# Patient Record
Sex: FEMALE | Race: WHITE
[De-identification: names, ages, dates, MRNs, and addresses within clinical notes are randomized per-mention and may not be internally consistent; named-entity substitution may affect disease eponyms.]

---

## 2018-08-21 ENCOUNTER — HOSPITAL ENCOUNTER (OUTPATIENT)
Dept: HOSPITAL 56 - MW.SDS | Age: 73
Discharge: HOME | End: 2018-08-21
Attending: SURGERY
Payer: MEDICARE

## 2018-08-21 VITALS — SYSTOLIC BLOOD PRESSURE: 166 MMHG | DIASTOLIC BLOOD PRESSURE: 69 MMHG

## 2018-08-21 DIAGNOSIS — F32.9: ICD-10-CM

## 2018-08-21 DIAGNOSIS — Z87.891: ICD-10-CM

## 2018-08-21 DIAGNOSIS — Z88.8: ICD-10-CM

## 2018-08-21 DIAGNOSIS — F41.9: ICD-10-CM

## 2018-08-21 DIAGNOSIS — K29.50: ICD-10-CM

## 2018-08-21 DIAGNOSIS — K29.80: ICD-10-CM

## 2018-08-21 DIAGNOSIS — Z79.899: ICD-10-CM

## 2018-08-21 DIAGNOSIS — E03.9: ICD-10-CM

## 2018-08-21 DIAGNOSIS — Z79.82: ICD-10-CM

## 2018-08-21 DIAGNOSIS — K63.5: ICD-10-CM

## 2018-08-21 DIAGNOSIS — J20.9: ICD-10-CM

## 2018-08-21 DIAGNOSIS — Z86.010: ICD-10-CM

## 2018-08-21 DIAGNOSIS — M19.90: ICD-10-CM

## 2018-08-21 DIAGNOSIS — I10: ICD-10-CM

## 2018-08-21 DIAGNOSIS — Z12.11: Primary | ICD-10-CM

## 2018-08-21 DIAGNOSIS — Z88.7: ICD-10-CM

## 2018-08-21 DIAGNOSIS — Z91.048: ICD-10-CM

## 2018-08-21 DIAGNOSIS — K21.9: ICD-10-CM

## 2018-08-21 PROCEDURE — 43239 EGD BIOPSY SINGLE/MULTIPLE: CPT

## 2018-08-21 PROCEDURE — 45380 COLONOSCOPY AND BIOPSY: CPT

## 2018-08-21 NOTE — PCM.OPNOTE
- General Post-Op/Procedure Note


Date of Surgery/Procedure: 08/21/18


Operative Procedure(s): EGD and colonoscopy


Findings: 





Gastritis and duodenitis, sigmoid colon polyps x 3


Pre Op Diagnosis: Heartburn, history of colon polyps


Post-Op Diagnosis: Sigmoid colon polyp x 3, gastritis and duodenitis


Anesthesia Technique: MAC


Primary Surgeon: Jolynn Lozoya


Condition: Good

## 2018-08-21 NOTE — PCM.PREANE
Preanesthetic Assessment





- Procedure


Proposed Procedure: 





EGD and Colonoscopy








- Anesthesia/Transfusion/Family Hx


Anesthesia History: Prior Anesthesia Without Reaction


Other Type of Anesthesia Reaction Comment: Denies any known problem in past, 

awoke briefly w/prior colonoscopy


Family History of Anesthesia Reaction: No


Transfusion History: No Prior Transfusion(s)


Intubation History: Unknown





- Review of Systems


General: No Symptoms


Pulmonary: No Symptoms


Cardiovascular: No Symptoms


Gastrointestinal: Other (hx of polyps)


Other: Reports: None





- Physical Assessment


O2 Sat by Pulse Oximetry: 98


Respiratory Rate: 20


Vital Signs: 





 Last Vital Signs











Temp      


 


Pulse  66   08/21/18 11:00


 


Resp  20   08/21/18 11:00


 


BP  160/71 H  08/21/18 11:00


 


Pulse Ox  98   08/21/18 11:00











Height: 5 ft 7 in


Weight: 203 lb


ASA Class: 2


Mental Status: Alert & Oriented x3


Airway Class: Mallampati = 1


Dentition: Reports: Normal Dentition (lowers), Dentures (upper), Partial


Thyro-Mental Finger Breadths: 3


Mouth Opening Finger Breadths: 3


ROM/Head Extension: Full


Lungs: Clear to Auscultation, Normal Respiratory Effort


Cardiovascular: Regular Rate, Regular Rhythm, No Murmurs





- Allergies


Allergies/Adverse Reactions: 


 Allergies











Allergy/AdvReac Type Severity Reaction Status Date / Time


 


vilazodone HCl [From Viibryd] Allergy  Agitation Verified 08/16/18 10:20














- Blood


Blood Available: No


Product(s) Available: None





- Anesthesia Plan


Pre-Op Medication Ordered: None





- Acknowledgements


Anesthesia Type Planned: MAC


Pt an Appropriate Candidate for the Planned Anesthesia: Yes


Alternatives and Risks of Anesthesia Discussed w Pt/Guardian: Yes


Pt/Guardian Understands and Agrees with Anesthesia Plan: Yes





PreAnesthesia Questionnaire


HEENT History: Reports: Other (See Below)


Other HEENT History: DIminished hearing bilaterally, wears glasses, top plate 

and bottom partial


Cardiovascular History: Reports: Hypertension


Respiratory History: Reports: None, Other (See Below)


Other Respiratory History: acute bronchitis in June 2018


Gastrointestinal History: Reports: Colon Polyp, GERD


Genitourinary History: Reports: None


Musculoskeletal History: Reports: Arthritis


Other Musculoskeletal History: hx: fracture toes, fx: wrist, Hairline fx Left 

skull,


Neurological History: Reports: Other (See Below)


Other Neuro History: hairline skull fx


Psychiatric History: Reports: Anxiety, Depression


Endocrine/Metabolic History: Reports: Hypothyroidism


Other Endocrine/Metabolic History: hypothyroidism


Hematologic History: Reports: None


Immunologic History: Reports: None


Oncologic (Cancer) History: Reports: None


Dermatologic History: Reports: Other (See Below)


Other Dermatologic History: dry skin, toe fungus





- Infectious Disease History


Infectious Disease History: Reports: Chicken Pox





- Past Surgical History


Head Surgeries/Procedures: Reports: None


GI Surgical History: Reports: Colonoscopy, EGD


Other GI Surgeries/Procedures: hx: polyps


Female  Surgical History: Reports: Breast Biopsy


Oncologic Surgical History: Reports: Biopsy of Breast


Other Oncologic Surgeries/Procedures: Breast biospy yrs ago, benign





- SUBSTANCE USE


Smoking Status *Q: Current Every Day Smoker


Tobacco Use Within Last Twelve Months: Cigarettes


Recreational Drug Use History: No





- HOME MEDS


Home Medications: 


 Home Meds





Levothyroxine 100 mcg PO ACBREAKFAST 09/04/15 [History]


Losartan [Cozaar] 100 mg PO DAILY 09/04/15 [History]


Aspirin [[place] Aspirin] 81 mg PO DAILY 08/16/18 [History]


Desvenlafaxine [Desvenlafaxine ER] 50 mg PO BID 08/16/18 [History]


Mg Trisilicate/AlH/NahCO3/AA [Gaviscon 80-14.2 MG] 1 tab.chew CHEW ASDIRECTED 

PRN 08/16/18 [History]











- CURRENT (IN HOUSE) MEDS


Current Meds: 





 Current Medications





Lactated Ringer's (Ringers, Lactated)  1,000 mls @ 125 mls/hr IV ASDIRECTED ELLIOTT


Sodium Chloride (Saline Flush)  10 ml FLUSH ASDIRECTED PRN


   PRN Reason: Keep Vein Open


Sodium Chloride (Saline Flush)  2.5 ml FLUSH ASDIRECTED PRN


   PRN Reason: Keep Vein Open


Sodium Chloride (Saline Flush)  10 ml FLUSH ASDIRECTED PRN


   PRN Reason: Keep Vein Open


Sodium Chloride (Saline Flush)  2.5 ml FLUSH ASDIRECTED PRN


   PRN Reason: Keep Vein Open





Discontinued Medications





Fentanyl (Sublimaze) Confirm Administered Dose 100 mcg .ROUTE .STK-MED ONE


   Stop: 08/21/18 09:32


Lidocaine (Xylocaine-Mpf 2%) Confirm Administered Dose 10 ml .ROUTE .STK-MED ONE


   Stop: 08/21/18 09:32


Midazolam HCl (Versed 1 Mg/Ml) Confirm Administered Dose 2 mg .ROUTE .STK-MED 

ONE


   Stop: 08/21/18 09:32


Propofol (Diprivan  20 Ml) Confirm Administered Dose 400 mg .ROUTE .STK-MED ONE


   Stop: 08/21/18 09:32

## 2023-02-03 ENCOUNTER — HOSPITAL ENCOUNTER (EMERGENCY)
Dept: HOSPITAL 56 - MW.ED | Age: 78
Discharge: HOME | End: 2023-02-03
Payer: MEDICARE

## 2023-02-03 VITALS — SYSTOLIC BLOOD PRESSURE: 155 MMHG | DIASTOLIC BLOOD PRESSURE: 66 MMHG | HEART RATE: 70 BPM

## 2023-02-03 DIAGNOSIS — Z79.899: ICD-10-CM

## 2023-02-03 DIAGNOSIS — K21.9: ICD-10-CM

## 2023-02-03 DIAGNOSIS — I10: ICD-10-CM

## 2023-02-03 DIAGNOSIS — Z88.5: ICD-10-CM

## 2023-02-03 DIAGNOSIS — E03.9: ICD-10-CM

## 2023-02-03 DIAGNOSIS — S06.6X0A: Primary | ICD-10-CM
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
## 2023-02-03 DIAGNOSIS — Z23: ICD-10-CM

## 2023-02-03 PROCEDURE — 90471 IMMUNIZATION ADMIN: CPT

## 2023-02-03 PROCEDURE — 73502 X-RAY EXAM HIP UNI 2-3 VIEWS: CPT

## 2023-02-03 PROCEDURE — 72125 CT NECK SPINE W/O DYE: CPT

## 2023-02-03 PROCEDURE — 70450 CT HEAD/BRAIN W/O DYE: CPT

## 2023-02-03 PROCEDURE — 90715 TDAP VACCINE 7 YRS/> IM: CPT

## 2023-02-03 PROCEDURE — 99284 EMERGENCY DEPT VISIT MOD MDM: CPT

## 2023-02-03 PROCEDURE — 73562 X-RAY EXAM OF KNEE 3: CPT

## 2023-06-27 ENCOUNTER — HOSPITAL ENCOUNTER (EMERGENCY)
Dept: HOSPITAL 56 - MW.ED | Age: 78
Discharge: HOME | End: 2023-06-27
Payer: MEDICARE

## 2023-06-27 VITALS — SYSTOLIC BLOOD PRESSURE: 159 MMHG | HEART RATE: 72 BPM | DIASTOLIC BLOOD PRESSURE: 70 MMHG

## 2023-06-27 DIAGNOSIS — Z88.5: ICD-10-CM

## 2023-06-27 DIAGNOSIS — K59.00: Primary | ICD-10-CM

## 2023-06-27 DIAGNOSIS — E03.9: ICD-10-CM

## 2023-06-27 DIAGNOSIS — K21.9: ICD-10-CM

## 2023-06-27 DIAGNOSIS — Z79.899: ICD-10-CM

## 2023-06-27 DIAGNOSIS — Z79.82: ICD-10-CM

## 2023-07-05 ENCOUNTER — HOSPITAL ENCOUNTER (EMERGENCY)
Dept: HOSPITAL 56 - MW.ED | Age: 78
Discharge: NURSING, REHAB OR LONG-TERM CARE | End: 2023-07-05
Payer: MEDICARE

## 2023-07-05 VITALS — DIASTOLIC BLOOD PRESSURE: 73 MMHG | SYSTOLIC BLOOD PRESSURE: 190 MMHG

## 2023-07-05 VITALS — HEART RATE: 82 BPM

## 2023-07-05 DIAGNOSIS — I12.9: ICD-10-CM

## 2023-07-05 DIAGNOSIS — Z88.8: ICD-10-CM

## 2023-07-05 DIAGNOSIS — M19.90: ICD-10-CM

## 2023-07-05 DIAGNOSIS — E83.52: Primary | ICD-10-CM

## 2023-07-05 DIAGNOSIS — E03.9: ICD-10-CM

## 2023-07-05 DIAGNOSIS — Z20.822: ICD-10-CM

## 2023-07-05 DIAGNOSIS — K21.9: ICD-10-CM

## 2023-07-05 DIAGNOSIS — D36.0: ICD-10-CM

## 2023-07-05 DIAGNOSIS — Z79.82: ICD-10-CM

## 2023-07-05 DIAGNOSIS — D38.1: ICD-10-CM

## 2023-07-05 DIAGNOSIS — I10: ICD-10-CM

## 2023-07-05 DIAGNOSIS — Z79.899: ICD-10-CM

## 2023-07-05 DIAGNOSIS — E87.1: ICD-10-CM

## 2023-07-05 DIAGNOSIS — N18.30: ICD-10-CM

## 2023-07-05 LAB
ALBUMIN SERPL-MCNC: 2.6 G/DL (ref 3.4–5)
ALBUMIN/GLOB SERPL: 0.6 {RATIO} (ref 0.9–1.6)
ALP SERPL-CCNC: 87 U/L (ref 46–116)
ALT SERPL-CCNC: 14 IU/L (ref 14–63)
APPEARANCE UR: CLEAR
AST SERPL-CCNC: 21 IU/L (ref 15–37)
BACTERIA URNS QL MICRO: (no result)
BASOPHILS # BLD AUTO: 0 K/UL (ref 0–0.1)
BASOPHILS NFR BLD AUTO: 0.3 % (ref 0–1.5)
BILIRUB SERPL-MCNC: 0.7 MG/DL (ref 0.2–1)
BILIRUB UR STRIP-MCNC: NEGATIVE MG/DL
BUN SERPL-MCNC: 14 MG/DL (ref 7–18)
CALCIUM SERPL-MCNC: 11.6 MG/DL (ref 8.5–10.1)
CHLORIDE SERPL-SCNC: 97 MMOL/L (ref 98–107)
CO2 SERPL-SCNC: 27 MMOL/L (ref 21–32)
COLOR UR: YELLOW
CREAT CL 24H UR+SERPL-VRATE: 42.31 ML/MIN
CREAT SERPL-MCNC: 1.1 MG/DL (ref 0.6–1)
EGFRCR SERPLBLD CKD-EPI 2021: 52 ML/MIN (ref 60–?)
EOSINOPHIL # BLD AUTO: 0.1 K/UL (ref 0–0.7)
EOSINOPHIL NFR BLD AUTO: 1 % (ref 0–7)
EPI CELLS #/AREA URNS HPF: (no result) /[HPF]
GLOBULIN SER-MCNC: 4.6 G/DL (ref 2.6–4)
GLUCOSE SERPL-MCNC: 120 MG/DL (ref 74–106)
GLUCOSE UR STRIP-MCNC: NEGATIVE MG/DL
HCT VFR BLD AUTO: 32.4 % (ref 36–46)
HGB BLD-MCNC: 10.6 G/DL (ref 12–16)
KETONES UR STRIP-MCNC: NEGATIVE MG/DL
LYMPHOCYTES # BLD AUTO: 1.6 K/UL (ref 0.6–2.4)
LYMPHOCYTES NFR BLD AUTO: 14.3 % (ref 16–40)
MAGNESIUM SERPL-MCNC: 2 MG/DL (ref 1.8–2.4)
MCH RBC QN AUTO: 28.1 PG (ref 27–32)
MCHC RBC AUTO-ENTMCNC: 32.7 G/DL (ref 31–37)
MCHC RBC AUTO-ENTMCNC: 85.9 FL (ref 80–98)
MONOCYTES # BLD AUTO: 1.4 K/UL (ref 0–0.8)
MONOCYTES NFR BLD AUTO: 12.2 % (ref 0–15)
MUCOUS THREADS URNS QL MICRO: (no result)
NEUTROPHILS # BLD AUTO: 8.1 K/UL (ref 1.4–5.7)
NEUTROPHILS NFR BLD AUTO: 72.2 % (ref 48–80)
NITRITE UR QL: NEGATIVE
NRBC BLD AUTO-RTO: 0 /100WBC
NRBC BLD AUTO-RTO: 0 K/UL
PH UR STRIP: 6 [PH] (ref 5–8)
PLATELET # BLD AUTO: 363 K/UL (ref 150–400)
PMV BLD AUTO: 8.7 FL (ref 7.4–12)
POTASSIUM SERPL-SCNC: 3.6 MMOL/L (ref 3.5–5.1)
PROT SERPL-MCNC: 7.2 G/DL (ref 6.4–8.2)
PROT UR STRIP-MCNC: NEGATIVE MG/DL
RBC # BLD AUTO: 3.77 M/UL (ref 4.3–5.9)
RBC # URNS HPF: (no result) /ML
RBC UR QL: (no result)
SODIUM SERPL-SCNC: 131 MMOL/L (ref 136–145)
SP GR UR STRIP: 1.02 (ref 1–1.03)
T4 FREE SERPL-MCNC: 1.36 NG/DL (ref 0.76–1.46)
TSH SERPL DL<=0.005 MIU/L-ACNC: 4.7 UIU/ML (ref 0.36–3.74)
UROBILINOGEN UR STRIP-ACNC: 1 EU/DL (ref ?–2)
WBC # BLD AUTO: 11.22 K/UL (ref 4–11)
WBC UR QL: (no result)

## 2023-07-05 PROCEDURE — 80053 COMPREHEN METABOLIC PANEL: CPT

## 2023-07-05 PROCEDURE — 99285 EMERGENCY DEPT VISIT HI MDM: CPT

## 2023-07-05 PROCEDURE — 81001 URINALYSIS AUTO W/SCOPE: CPT

## 2023-07-05 PROCEDURE — 96361 HYDRATE IV INFUSION ADD-ON: CPT

## 2023-07-05 PROCEDURE — 70450 CT HEAD/BRAIN W/O DYE: CPT

## 2023-07-05 PROCEDURE — 83735 ASSAY OF MAGNESIUM: CPT

## 2023-07-05 PROCEDURE — 84439 ASSAY OF FREE THYROXINE: CPT

## 2023-07-05 PROCEDURE — 71260 CT THORAX DX C+: CPT

## 2023-07-05 PROCEDURE — 84443 ASSAY THYROID STIM HORMONE: CPT

## 2023-07-05 PROCEDURE — 82140 ASSAY OF AMMONIA: CPT

## 2023-07-05 PROCEDURE — 36415 COLL VENOUS BLD VENIPUNCTURE: CPT

## 2023-07-05 PROCEDURE — 71046 X-RAY EXAM CHEST 2 VIEWS: CPT

## 2023-07-05 PROCEDURE — U0002 COVID-19 LAB TEST NON-CDC: HCPCS

## 2023-07-05 PROCEDURE — 96374 THER/PROPH/DIAG INJ IV PUSH: CPT

## 2023-07-05 PROCEDURE — 85025 COMPLETE CBC W/AUTO DIFF WBC: CPT

## 2023-08-03 NOTE — OR
SURGEON:

KELLEN JUAREZ MD

 

DATE OF PROCEDURE:  08/21/2018

 

PREOPERATIVE DIAGNOSES:

Heartburn and history of colon polyps.

 

POSTOPERATIVE DIAGNOSES:

1. Gastritis and duodenitis.

2. Sigmoid colon polyps x3.

 

PROCEDURE PERFORMED:

Diagnostic EGD and colonoscopy.

 

INSTRUMENT USED:

Olympus endoscope and colonoscope.

 

EXTENT OF EXAM:

To the second portion of duodenum, to the cecum.

 

PREPARATION:

Good.

 

LIMITATIONS:

None.

 

INDICATIONS FOR EXAMINATION:

The patient is a 72-year-old female, who presents with complaints of heartburn

as well as with a history of colon polyps.  It is time for her repeat

colonoscopy as she has never had an EGD.  We discussed the EGD and colonoscopy

procedures as well as the expected perioperative course.  We discussed the risks

including bleeding, infection, damage to surrounding structures including

perforation.  The patient verbalized understanding and wishes to proceed.

 

PROCEDURE IN DETAIL:

The patient was brought into the endoscopy suite and placed in a left lateral

decubitus position.  A time-out was completed verifying the patient's name, age,

date of birth, allergies, and procedure to be performed.  Monitored anesthesia

care was induced, and a bite block was placed in the patient's mouth.

Continuous oxygen was provided via nasal cannula throughout the procedure.

After adequate sedation was achieved, a well lubricated endoscope was placed in

the patient's mouth and advanced under direct visualization to the second

portion of the duodenum.  There was evidence of some mild duodenitis at this

level.  A photograph was taken.  The scope was then fully withdrawn while

examining the color, texture, anatomy, and integrity of the mucosa of the upper

GI tract.  The patient had more inflammation within the first bulb of the

duodenum.  The scope was brought into the stomach, and a photograph taken of the

pylorus as well as the GE junction.  The patient was noted to have some

inflammation around the antrum.  Biopsies were taken of the gastric antrum,

body, and fundus and sent for H. pylori testing and histologic review.  The

scope was then brought into the distal esophagus, and a photograph was taken of

the Z-line, and this appeared normal.  The remainder of the esophageal mucosa

was free of pathology.  The scope was removed, and this portion of procedure

terminated.  A digital rectal exam was performed.  This exam was within normal

limits.  A well lubricated colonoscope was inserted into the rectum and advanced

under direct visualization to the level of the cecum.  The cecum was identified

by both visual and anatomic landmarks.  A photograph was taken of the cecal cap;

however, it was unable to retroflex the scope within the cecum.  The scope was

then fully withdrawn while examining the color, texture, anatomy, and integrity

of the mucosa from the cecum to the anal canal.  The patient was found to have 3

sessile polyps within the sigmoid colon.  These were removed using a cold biopsy

forceps.  The scope was then brought into the rectum and retroflexed to allow

visualization of the anal canal opening.  This appeared normal, and a photograph

was taken.  The scope was then straightened out and fully withdrawn.  The cecum

to anus time was 8 minutes.  The patient tolerated the procedure well and was

transferred to the PACU in stable condition.

 

ENDOSCOPIC DIAGNOSES:

1. Gastritis and duodenitis.

2. Sigmoid colon polyps x3.

 

RECOMMENDATIONS:

I placed the patient on omeprazole 20 mg daily.  I encouraged her to avoid foods

that could irritate the lining of the stomach.  I will see her in clinic in 2

weeks to review her pathology results.

 

 

HORACE VAZQUEZ

DD:  08/22/2018 08:20:51

DT:  08/22/2018 08:58:46

Job #:  [id number]
No